# Patient Record
(demographics unavailable — no encounter records)

---

## 2024-11-18 NOTE — PHYSICAL EXAM
[Alert] : alert [EOMI] : grossly EOMI [Clear] : right tympanic membrane clear [Pink Nasal Mucosa] : pink nasal mucosa [Erythematous Oropharynx] : erythematous oropharynx [NL] : supple, full passive range of motion [Symmetric Chest Wall] : symmetric chest wall [Crackles] : crackles [Acute Distress] : no acute distress [Tenderness] : no tenderness [Enlarged Tonsils] : tonsils not enlarged [FreeTextEntry4] : some wet opaque mucus seen within nostrils [FreeTextEntry1] : T98.7F [de-identified] : pharynx slightly reddened, no swelling, no exudate. [FreeTextEntry7] : few crackles to RLL. Breath sounds are symmetric. One wet cough witnessed during office visit.

## 2024-11-18 NOTE — HISTORY OF PRESENT ILLNESS
[de-identified] : cough, sore throat [FreeTextEntry6] : He has a cough for two weeks. Fever on the first day then none since. His throat initially hurt, then stopped, now today hurts again. Cough is mainly first thing in the morning. He has taken Mucinex. He is not doing any gargles.  No one else is sick at home. One of his cousins was sick and that cousin has pneumonia. This office is his medical home.

## 2024-11-18 NOTE — REVIEW OF SYSTEMS
[Nasal Congestion] : nasal congestion [Cough] : cough [Negative] : Skin [Fever] : no fever [Chills] : no chills [Night Sweats] : no night sweats

## 2024-11-18 NOTE — DISCUSSION/SUMMARY
[FreeTextEntry1] : Pneumonia: ZPACK given. Steam treatments. PO clear fluids. F/U if no improvement or if  cough  is not resolving